# Patient Record
Sex: FEMALE | Race: WHITE | NOT HISPANIC OR LATINO | URBAN - NONMETROPOLITAN AREA
[De-identification: names, ages, dates, MRNs, and addresses within clinical notes are randomized per-mention and may not be internally consistent; named-entity substitution may affect disease eponyms.]

---

## 2024-11-17 ENCOUNTER — CLINICAL SUPPORT (OUTPATIENT)
Dept: URGENT CARE | Facility: URGENT CARE | Age: 39
End: 2024-11-17
Payer: COMMERCIAL

## 2024-11-17 VITALS
WEIGHT: 130.07 LBS | DIASTOLIC BLOOD PRESSURE: 84 MMHG | TEMPERATURE: 98.2 F | HEART RATE: 82 BPM | SYSTOLIC BLOOD PRESSURE: 129 MMHG | BODY MASS INDEX: 23.05 KG/M2 | RESPIRATION RATE: 18 BRPM | OXYGEN SATURATION: 98 %

## 2024-11-17 DIAGNOSIS — J02.9 ACUTE PHARYNGITIS, UNSPECIFIED ETIOLOGY: ICD-10-CM

## 2024-11-17 DIAGNOSIS — J06.9 VIRAL URI: Primary | ICD-10-CM

## 2024-11-17 PROCEDURE — 99203 OFFICE O/P NEW LOW 30 MIN: CPT | Performed by: FAMILY MEDICINE

## 2024-11-17 PROCEDURE — 3074F SYST BP LT 130 MM HG: CPT | Performed by: FAMILY MEDICINE

## 2024-11-17 PROCEDURE — 3079F DIAST BP 80-89 MM HG: CPT | Performed by: FAMILY MEDICINE

## 2024-11-17 RX ORDER — AMOXICILLIN 875 MG/1
875 TABLET, FILM COATED ORAL 2 TIMES DAILY
Qty: 14 TABLET | Refills: 0 | Status: SHIPPED | OUTPATIENT
Start: 2024-11-17 | End: 2024-11-24

## 2024-11-17 NOTE — PATIENT INSTRUCTIONS
You have a viral upper respiratory infection with cough.  Please increase your oral fluids for the next 7-10 days  May take ibuprofen 200mg, 2 tablets by mouth every 8 hours with food for discomfort.  May use Tylenol 325 mg, one or 2 tablets by mouth every 4-6 hours as needed for additional pain relief  You may mix 1 teaspoon of table salt with 8 ounces of warm water to rinse and gargle your sore throat.  You may do this repeatedly for up to 15 minutes if it seems to relieve your discomfort.  Do not swallow this liquid  Is discussed, I have also sent a prescription to your pharmacy if your symptoms do not improve in the next couple of days you may wish to try an antibiotic.  This medication is perfectly safe for use while you are nursing.  If no better in 5-7 days please follow-up with primary care provider  If fever greater than 102 degrees Fahrenheit, chills, nausea, vomiting, increased difficulty breathing, difficulty swallowing, increased wheezing, shortness of breath please go immediately to emergency room for further evaluation  This note was generated by voice recognition software. Minor transcription/grammatical errors may be present. Please call for clarification.

## 2024-11-18 PROBLEM — D50.9 MATERNAL IRON DEFICIENCY ANEMIA COMPLICATING PREGNANCY, THIRD TRIMESTER (HHS-HCC): Status: ACTIVE | Noted: 2023-11-10

## 2024-11-18 PROBLEM — S82.892A CLOSED FRACTURE OF LEFT ANKLE: Status: ACTIVE | Noted: 2023-11-28

## 2024-11-18 PROBLEM — O99.013 MATERNAL IRON DEFICIENCY ANEMIA COMPLICATING PREGNANCY, THIRD TRIMESTER (HHS-HCC): Status: ACTIVE | Noted: 2023-11-10

## 2024-11-18 PROBLEM — O16.3 ELEVATED BLOOD PRESSURE AFFECTING PREGNANCY IN THIRD TRIMESTER, ANTEPARTUM (HHS-HCC): Status: ACTIVE | Noted: 2023-11-18

## 2024-11-18 PROBLEM — O13.3 GESTATIONAL HYPERTENSION WITHOUT SIGNIFICANT PROTEINURIA IN THIRD TRIMESTER (HHS-HCC): Status: ACTIVE | Noted: 2023-12-07

## 2024-11-18 PROBLEM — O9A.213: Status: ACTIVE | Noted: 2023-11-29

## 2024-11-18 PROBLEM — O34.12: Status: ACTIVE | Noted: 2023-07-07

## 2024-11-18 PROBLEM — O36.5990 FETAL GROWTH RESTRICTION ANTEPARTUM (HHS-HCC): Status: ACTIVE | Noted: 2023-12-07

## 2024-11-18 PROBLEM — D25.9: Status: ACTIVE | Noted: 2023-07-07

## 2024-11-18 PROBLEM — S82.899A ANKLE FRACTURE: Status: ACTIVE | Noted: 2023-11-19

## 2024-11-18 PROBLEM — O34.592: Status: ACTIVE | Noted: 2023-07-07

## 2024-11-18 PROBLEM — K90.9 IMPAIRED INTESTINAL ABSORPTION (HHS-HCC): Status: ACTIVE | Noted: 2023-11-10

## 2024-11-18 PROBLEM — O09.92 SUPERVISION OF HIGH RISK PREGNANCY IN SECOND TRIMESTER (HHS-HCC): Status: ACTIVE | Noted: 2023-07-11

## 2024-11-18 PROBLEM — O36.5930 POOR FETAL GROWTH AFFECTING MANAGEMENT OF MOTHER IN THIRD TRIMESTER (HHS-HCC): Status: ACTIVE | Noted: 2023-12-07

## 2024-11-18 ASSESSMENT — ENCOUNTER SYMPTOMS
HEADACHES: 1
SINUS PRESSURE: 0
CHILLS: 1
ABDOMINAL PAIN: 0
CHEST TIGHTNESS: 0
VOMITING: 0
SORE THROAT: 1
WHEEZING: 0
CONSTIPATION: 0
RHINORRHEA: 1
FREQUENCY: 0
PALPITATIONS: 0
DYSURIA: 0
DIARRHEA: 0
SHORTNESS OF BREATH: 0
FEVER: 1
NAUSEA: 0
COUGH: 1

## 2024-11-18 NOTE — PROGRESS NOTES
Subjective   Patient ID: Essie Morton is a 39 y.o. female.    HPI: 39-year-old female presents with a complaint of 9-day history of upper respiratory symptoms losing rhinorrhea and nasal congestion and sore throat.  Patient currently breast-feeding 11-month-old infant.      History provided by:  Patient   used: No    Cough  Associated symptoms include chills, a fever, headaches, rhinorrhea and a sore throat. Pertinent negatives include no ear pain, shortness of breath or wheezing.       The following portions of the chart were reviewed this encounter and updated as appropriate:  Allergies  Meds  Problems  Med Hx  Surg Hx  Fam Hx         Review of Systems   Constitutional:  Positive for chills and fever.   HENT:  Positive for congestion, rhinorrhea and sore throat. Negative for ear pain and sinus pressure.    Respiratory:  Positive for cough. Negative for chest tightness, shortness of breath and wheezing.    Cardiovascular:  Negative for palpitations.   Gastrointestinal:  Negative for abdominal pain, constipation, diarrhea, nausea and vomiting.   Genitourinary:  Negative for dysuria and frequency.   Neurological:  Positive for headaches.     Objective   Physical Exam  Vital signs are reviewed. Alert and oriented x3 with normal mood and affect  Patient is well nourished, well-developed, alert and in no acute distress  No pain to palpation over frontal, ethmoid or maxillary sinus areas    External eyes, orbits, conjunctiva and eyelids are normal in appearance  Pupils are equal, round, reactive to light and accommodation, extraocular movements intact    External ears appear normal  External canals are normal in appearance  Right tympanic membrane is intact and pale gray in appearance  Left tympanic membrane is intact and pale gray in appearance  There is no middle ear effusion noted on the right  There is no middle ear effusion noted on the left  External appearance of the nose is  normal  Nasal mucosa, septum, turbinates are dark pink and moderately swollen in appearance  There is yellow nasal discharge in both nares    Oral mucosa is uniformly pink and moist  Palate is pink, symmetric and intact  Tongue is moist, mobile and midline  Tonsils are present, mildly enlarged, moderately erythematous with no concretions or exudates present  Slight anterior tender cervical lymphadenopathy palpated    Heart has regular rate and rhythm. No murmurs, rubs or gallops are auscultated at this exam.    Respiratory rate rhythm and effort are normal. Breath sounds bilaterally are clear on auscultation without crackles, rhonchi, wheezes or friction rub.    Abdomen: Normal bowel sounds on auscultation. Soft, nontender without rebound or rigidity on palpation  Procedures    Assessment/Plan     MDM: Suspect strep pharyngitis given clinical exam.  Diagnoses and all orders for this visit:  Viral URI  Acute pharyngitis, unspecified etiology  -     amoxicillin (Amoxil) 875 mg tablet; Take 1 tablet (875 mg) by mouth 2 times a day for 7 days.    Patient disposition: Home